# Patient Record
Sex: MALE | Race: WHITE | Employment: FULL TIME | URBAN - NONMETROPOLITAN AREA
[De-identification: names, ages, dates, MRNs, and addresses within clinical notes are randomized per-mention and may not be internally consistent; named-entity substitution may affect disease eponyms.]

---

## 2023-01-04 ENCOUNTER — HOSPITAL ENCOUNTER (EMERGENCY)
Age: 28
Discharge: HOME | End: 2023-01-04
Payer: COMMERCIAL

## 2023-01-04 VITALS
TEMPERATURE: 98.3 F | WEIGHT: 180 LBS | OXYGEN SATURATION: 98 % | SYSTOLIC BLOOD PRESSURE: 139 MMHG | BODY MASS INDEX: 23.86 KG/M2 | RESPIRATION RATE: 16 BRPM | HEART RATE: 91 BPM | HEIGHT: 73 IN | DIASTOLIC BLOOD PRESSURE: 79 MMHG

## 2023-01-04 DIAGNOSIS — H16.133 PHOTOKERATITIS OF BOTH EYES: Primary | ICD-10-CM

## 2023-01-04 PROCEDURE — 99203 OFFICE O/P NEW LOW 30 MIN: CPT

## 2023-01-04 RX ORDER — ERYTHROMYCIN 5 MG/G
OINTMENT OPHTHALMIC
Qty: 3.5 G | Refills: 0 | Status: SHIPPED | OUTPATIENT
Start: 2023-01-04

## 2023-01-04 ASSESSMENT — VISUAL ACUITY
OS: 20/15
OU: 20/15
OD: 20/15

## 2023-01-04 ASSESSMENT — PAIN SCALES - GENERAL: PAINLEVEL_OUTOF10: 3

## 2023-01-04 ASSESSMENT — PAIN DESCRIPTION - DESCRIPTORS: DESCRIPTORS: ACHING

## 2023-01-04 ASSESSMENT — PAIN DESCRIPTION - LOCATION: LOCATION: EYE

## 2023-01-04 ASSESSMENT — PAIN - FUNCTIONAL ASSESSMENT
PAIN_FUNCTIONAL_ASSESSMENT: 0-10
PAIN_FUNCTIONAL_ASSESSMENT: ACTIVITIES ARE NOT PREVENTED

## 2023-01-04 ASSESSMENT — PAIN DESCRIPTION - ORIENTATION: ORIENTATION: RIGHT;LEFT

## 2023-01-04 NOTE — ED TRIAGE NOTES
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 First degree burn to face. Bilateral eye discomfort. Feels like air is blowing into eyes worse with bright lights and cold air. Delay on helmet is not working.

## 2023-01-04 NOTE — Clinical Note
Beto Pierson was seen and treated in our emergency department on 1/4/2023. He may return to work on 01/06/2023. If you have any questions or concerns, please don't hesitate to call.       Jennifer Sanchez, APRN - CNP

## 2023-01-06 ASSESSMENT — ENCOUNTER SYMPTOMS
EYE PAIN: 1
EYE DISCHARGE: 0
PHOTOPHOBIA: 0
EYE REDNESS: 1

## 2023-02-13 ENCOUNTER — HOSPITAL ENCOUNTER (EMERGENCY)
Age: 28
Discharge: HOME | End: 2023-02-13
Attending: EMERGENCY MEDICINE
Payer: COMMERCIAL

## 2023-02-13 VITALS
TEMPERATURE: 98.4 F | SYSTOLIC BLOOD PRESSURE: 135 MMHG | BODY MASS INDEX: 23.75 KG/M2 | DIASTOLIC BLOOD PRESSURE: 84 MMHG | WEIGHT: 180 LBS | RESPIRATION RATE: 20 BRPM | HEART RATE: 129 BPM | OXYGEN SATURATION: 99 %

## 2023-02-13 DIAGNOSIS — T15.01XA FOREIGN BODY OF RIGHT CORNEA, INITIAL ENCOUNTER: Primary | ICD-10-CM

## 2023-02-13 PROCEDURE — 2500000003 HC RX 250 WO HCPCS

## 2023-02-13 PROCEDURE — 99283 EMERGENCY DEPT VISIT LOW MDM: CPT

## 2023-02-13 RX ORDER — PROPARACAINE HYDROCHLORIDE 5 MG/ML
2 SOLUTION/ DROPS OPHTHALMIC ONCE
Status: COMPLETED | OUTPATIENT
Start: 2023-02-13 | End: 2023-02-13

## 2023-02-13 RX ORDER — PROPARACAINE HYDROCHLORIDE 5 MG/ML
SOLUTION/ DROPS OPHTHALMIC
Status: COMPLETED
Start: 2023-02-13 | End: 2023-02-13

## 2023-02-13 RX ADMIN — PROPARACAINE HYDROCHLORIDE 2 DROP: 5 SOLUTION/ DROPS OPHTHALMIC at 21:10

## 2023-02-13 ASSESSMENT — PAIN SCALES - GENERAL: PAINLEVEL_OUTOF10: 3

## 2023-02-13 ASSESSMENT — PAIN - FUNCTIONAL ASSESSMENT: PAIN_FUNCTIONAL_ASSESSMENT: 0-10

## 2023-02-14 NOTE — ED NOTES
Pt comes to ED with c/o metal in eye. Pt states yesterday he was grinding metal and a piece got into his eye. PT states there is a black spec in his right eye. Pt states he tried to use an emergency eye wash at work but it made it worse. Pt denies trouble with vision at this time. Pt states his pain is a 3 out of 10.       Gena Hayes RN  02/13/23 2013

## 2023-02-14 NOTE — ED PROVIDER NOTES
PATIENT NAME: Mic Duff  MRN: [id number]  : 1995  HERNANDEZ: 2023    I performed a history and physical examination of the patient and discussed management with the Resident. I reviewed the Resident's note and agree with the documented findings and plan of care. Any areas of disagreement are noted on the chart. I was personally present for the key portions of any procedures and have documented in the chart those procedures where I was not present during the key portions. I have reviewed the emergency nurses triage note and agree with the chief complaint, past medical history, past surgical history, allergies, medications, social and family history as documented unless otherwise noted below. MEDICAL DEDISION MAKING (MDM)     Mic Duff is a 34 y.o. male who presents to Emergency Department with Eye Problem (right)     Possible right cornea FB which happened 4 days ago when he was welding. Exam: Visual acuity right eye at baseline. Right corneal foreign body, failed FB removal in the ED. Recommend ophthalmology follow-up tomorrow. Discharged in stable conditions. Vitals:    23   BP: 135/84   Pulse: (!) 129   Resp: 20   Temp: 98.4 °F (36.9 °C)   TempSrc: Oral   SpO2: 99%   Weight: 180 lb (81.6 kg)     Medications   proparacaine (ALCAINE) 0.5 % ophthalmic solution 2 drop (2 drops Right Eye Given 23)     Labs Reviewed - No data to display  No orders to display       FINAL IMPRESSION AND DISPOSITION      1.  Foreign body of right cornea, initial encounter        DISPOSITION Decision To Discharge 2023 09:29:35 PM      PATIENT REFERRED TO:  Radames Duong MD  [address]  [phone]    Call in 1 day      Annabella Manning MD  [address]     In 1 week        DISCHARGE MEDICATIONS:  Discharge Medication List as of 2023  9:26 PM          (Please note that portions of this note were completed with a voice recognition program.  Efforts were made to edit the dictations but occasionally words aremis-transcribed.)    MD Luc Rizvi MD  02/14/23 5510

## 2023-02-14 NOTE — ED PROVIDER NOTES
[facility] [address] [address] EMERGENCY DEPT      EMERGENCY MEDICINE     Pt Name: Benito Kramer  MRN: [id number]  [place] 1995  Date of evaluation: 2/13/2023  Provider: Thien Lindo MD  Supervising Physician: Dr. Sloan Ge       Chief Complaint   Patient presents with    Eye Problem     right     HISTORY OF PRESENT ILLNESS   Benito Kramer is a pleasant 34 y.o. male who presents to the emergency department for evaluation of foreign object in eye. 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 Patient used eyewash at work with no relief. PASTMEDICAL HISTORY     Past Medical History:   Diagnosis Date    Anxiety        There is no problem list on file for this patient. SURGICAL HISTORY     History reviewed. No pertinent surgical history. CURRENT MEDICATIONS       Discharge Medication List as of 2/13/2023  9:26 PM          ALLERGIES     has No Known Allergies. FAMILY HISTORY     He indicated that his mother is alive. He indicated that his father is alive. SOCIAL HISTORY       Social History     Tobacco Use    Smoking status: Former   Vaping Use    Vaping Use: Every day    Substances: Nicotine   Substance Use Topics    Alcohol use: No    Drug use: No       PHYSICAL EXAM       ED Triage Vitals [02/13/23 2009]   BP Temp Temp Source Heart Rate Resp SpO2 Height Weight   135/84 98.4 °F (36.9 °C) Oral (!) 129 20 99 % -- 180 lb (81.6 kg)       Physical Exam  Vitals reviewed. Constitutional:       Appearance: Normal appearance. He is normal weight. HENT:      Head: Normocephalic and atraumatic. Right Ear: External ear normal.      Left Ear: External ear normal.      Nose: Nose normal.      Mouth/Throat:      Mouth: Mucous membranes are moist.      Pharynx: Oropharynx is clear. Eyes:      General:         Right eye: Foreign body present.       Extraocular Movements: Extraocular movements intact. Conjunctiva/sclera: Conjunctivae normal.   Cardiovascular:      Rate and Rhythm: Normal rate and regular rhythm. Pulses: Normal pulses. Heart sounds: Normal heart sounds. Pulmonary:      Effort: Pulmonary effort is normal.      Breath sounds: Normal breath sounds. Abdominal:      General: Abdomen is flat. Bowel sounds are normal.      Palpations: Abdomen is soft. Musculoskeletal:         General: Normal range of motion. Cervical back: Normal range of motion. Skin:     General: Skin is warm and dry. Capillary Refill: Capillary refill takes less than 2 seconds. Neurological:      General: No focal deficit present. Mental Status: He is alert. Psychiatric:         Behavior: Behavior normal.         FORMAL DIAGNOSTIC RESULTS     RADIOLOGY: Interpretation per the Radiologist below, if available at the time of this note (none if blank): No orders to display       LABS: (none if blank)  Labs Reviewed - No data to display    (Any cultures that may have been sent were not resulted at the time of this patient visit)    [facility] / ED COURSE:     External Documentation Reviewed:         Previous patient encounter documents & history available on EMR was reviewed: Previous ED notes      1)           Differential Diagnosis includes (but not limited to):  Foreign object in cornea         Diagnoses Considered but I have low suspicion of:   Eye laceration       Decision Rules/Clinical Scores utilized:               See Formal Diagnostic Results above for the lab and radiology tests and orders.     3)  Treatment and Disposition         ED Reassessment:  See ED course         Case discussed with consulting clinician:           Shared Decision-Making was performed and disposition discussed with the        Patient/Family and questions answered          Social determinants of health impacting treatment or disposition:        Summary of Patient Presentation:      60-year-old male with no significant past medical history presents to ED for evaluation of foreign object going to right eye. Patient was welding while wearing equipment when he felt something go into his eye 4 days ago. On physical exam patient has foreign object right next to his cornea. EOM intact. No conjunctiva. Alcaine eyedrops used to remove foreign object, but it seems epithelial layer has grown over foreign object. Patient was recommended to contact ophthalmology tomorrow to make appointment for removal of foreign object. Patient also states he got tetanus shot 2 years ago at [facility].  Was recommended to follow-up with PCP as well. Vitals Reviewed:    Vitals:    02/13/23 2009   BP: 135/84   Pulse: (!) 129   Resp: 20   Temp: 98.4 °F (36.9 °C)   TempSrc: Oral   SpO2: 99%   Weight: 180 lb (81.6 kg)       The patient was seen and examined. Appropriate diagnostic testing was performed and results reviewed with the patient. The results of pertinent diagnostic studies and exam findings were discussed. The patients provisional diagnosis and plan of care were discussed with the patient and present family who expressed understanding. Any medications were reviewed and indications and risks of medications were discussed with the patient /family present. Strict verbal and written return precautions, instructions and appropriate follow-up provided to  the patient . ED Medications administered this visit:  (None if blank)  Medications   proparacaine (ALCAINE) 0.5 % ophthalmic solution 2 drop (2 drops Right Eye Given 2/13/23 2110)         PROCEDURES: (None if blank)  Procedures:     CRITICAL CARE: (None if blank)      DISCHARGE PRESCRIPTIONS: (None if blank)  Discharge Medication List as of 2/13/2023  9:26 PM          FINAL IMPRESSION      1.  Foreign body of right cornea, initial encounter          DISPOSITION/PLAN   DISPOSITION Decision To Discharge 02/13/2023 09:29:35 PM      OUTPATIENT FOLLOW UP THE PATIENT:  Adrienne Cassidy MD  [address]  [address]    Call in 1 day      Darin Tejeda MD  [facility] [address]    In 1 week      MD Darin Milner MD  Resident  02/13/23 Shelton Randle MD  Resident  02/13/23 7405

## 2023-05-29 ENCOUNTER — APPOINTMENT (OUTPATIENT)
Dept: GENERAL RADIOLOGY | Age: 28
End: 2023-05-29
Payer: COMMERCIAL

## 2023-05-29 ENCOUNTER — HOSPITAL ENCOUNTER (EMERGENCY)
Age: 28
Discharge: HOME | End: 2023-05-29
Attending: EMERGENCY MEDICINE
Payer: COMMERCIAL

## 2023-05-29 VITALS
TEMPERATURE: 98.8 F | SYSTOLIC BLOOD PRESSURE: 138 MMHG | RESPIRATION RATE: 16 BRPM | DIASTOLIC BLOOD PRESSURE: 80 MMHG | HEART RATE: 112 BPM | OXYGEN SATURATION: 98 %

## 2023-05-29 DIAGNOSIS — S39.012A BACK STRAIN, INITIAL ENCOUNTER: ICD-10-CM

## 2023-05-29 DIAGNOSIS — G57.01 PIRIFORMIS SYNDROME OF RIGHT SIDE: Primary | ICD-10-CM

## 2023-05-29 PROCEDURE — 72100 X-RAY EXAM L-S SPINE 2/3 VWS: CPT

## 2023-05-29 PROCEDURE — 99284 EMERGENCY DEPT VISIT MOD MDM: CPT | Performed by: EMERGENCY MEDICINE

## 2023-05-29 PROCEDURE — 6360000002 HC RX W HCPCS

## 2023-05-29 PROCEDURE — 96372 THER/PROPH/DIAG INJ SC/IM: CPT

## 2023-05-29 RX ORDER — ORPHENADRINE CITRATE 30 MG/ML
60 INJECTION INTRAMUSCULAR; INTRAVENOUS ONCE
Status: COMPLETED | OUTPATIENT
Start: 2023-05-29 | End: 2023-05-29

## 2023-05-29 RX ORDER — CYCLOBENZAPRINE HCL 10 MG
10 TABLET ORAL 3 TIMES DAILY PRN
Qty: 21 TABLET | Refills: 0 | Status: SHIPPED | OUTPATIENT
Start: 2023-05-29 | End: 2023-05-29 | Stop reason: SDUPTHER

## 2023-05-29 RX ORDER — KETOROLAC TROMETHAMINE 30 MG/ML
30 INJECTION, SOLUTION INTRAMUSCULAR; INTRAVENOUS ONCE
Status: COMPLETED | OUTPATIENT
Start: 2023-05-29 | End: 2023-05-29

## 2023-05-29 RX ORDER — CYCLOBENZAPRINE HCL 10 MG
10 TABLET ORAL DAILY PRN
Qty: 21 TABLET | Refills: 0 | Status: SHIPPED | OUTPATIENT
Start: 2023-05-29 | End: 2023-06-12

## 2023-05-29 RX ORDER — IBUPROFEN 600 MG/1
600 TABLET ORAL EVERY 8 HOURS PRN
Qty: 15 TABLET | Refills: 0 | Status: SHIPPED | OUTPATIENT
Start: 2023-05-29

## 2023-05-29 RX ADMIN — ORPHENADRINE CITRATE 60 MG: 60 INJECTION INTRAMUSCULAR; INTRAVENOUS at 18:11

## 2023-05-29 RX ADMIN — KETOROLAC TROMETHAMINE 30 MG: 30 INJECTION, SOLUTION INTRAMUSCULAR at 17:41

## 2023-05-29 ASSESSMENT — ENCOUNTER SYMPTOMS
BACK PAIN: 1
GASTROINTESTINAL NEGATIVE: 1
EYES NEGATIVE: 1
RESPIRATORY NEGATIVE: 1

## 2023-05-29 NOTE — ED PROVIDER NOTES
Patient is a , who operates heavy machinery. Patient also prescribed Motrin discharge. Pt was  given instructions on exercise of piriformis. Amount and/or Complexity of Data Reviewed  Tests in the radiology section of CPT®: ordered and reviewed  Discussion of test results with the performing providers: no  Decide to obtain previous medical records or to obtain history from someone other than the patient: no  Obtain history from someone other than the patient: no  Review and summarize past medical records: yes  Discuss the patient with other providers: yes  Independent visualization of images, tracings, or specimens: yes    Risk of Complications, Morbidity, and/or Mortality  Presenting problems: minimal  Diagnostic procedures: minimal  Management options: minimal    Patient Progress  Patient progress: improved     Final diagnoses:   Piriformis syndrome of right side   Back strain, initial encounter     Condition: condition: good  Dispo: Discharge to home      This transcription was electronically signed. Parts of this transcriptions may have been dictated by use of voice recognition software and electronically transcribed, and parts may have been transcribed with the assistance of an ED scribe. The transcription may contain errors not detected in proofreading. Please refer to my supervising physician's documentation if my documentation differs.     Electronically Signed: Sravan Javier DO, 05/29/23, 7:03 PM        Sravan Javier DO  Resident  05/29/23 Cynthia Nguyễn,   Resident  05/29/23 [id number]

## 2023-05-29 NOTE — ED TRIAGE NOTES
Pt to er. Pt c/o rt sided back pain that shoots down his rt leg. States has hx of sciatic nerve pain. No new injury.

## 2023-12-01 ENCOUNTER — HOSPITAL ENCOUNTER (EMERGENCY)
Age: 28
Discharge: HOME | End: 2023-12-01
Payer: COMMERCIAL

## 2023-12-01 VITALS
SYSTOLIC BLOOD PRESSURE: 147 MMHG | WEIGHT: 181.6 LBS | DIASTOLIC BLOOD PRESSURE: 102 MMHG | HEART RATE: 117 BPM | RESPIRATION RATE: 16 BRPM | TEMPERATURE: 98.4 F | BODY MASS INDEX: 23.96 KG/M2 | OXYGEN SATURATION: 100 %

## 2023-12-01 DIAGNOSIS — L50.9 URTICARIA: Primary | ICD-10-CM

## 2023-12-01 LAB
FLUAV AG SPEC QL: NEGATIVE
FLUBV AG SPEC QL: NEGATIVE

## 2023-12-01 PROCEDURE — 87804 INFLUENZA ASSAY W/OPTIC: CPT

## 2023-12-01 PROCEDURE — 6370000000 HC RX 637 (ALT 250 FOR IP): Performed by: NURSE PRACTITIONER

## 2023-12-01 PROCEDURE — 96372 THER/PROPH/DIAG INJ SC/IM: CPT

## 2023-12-01 PROCEDURE — 6360000002 HC RX W HCPCS: Performed by: NURSE PRACTITIONER

## 2023-12-01 PROCEDURE — 2580000003 HC RX 258: Performed by: NURSE PRACTITIONER

## 2023-12-01 PROCEDURE — 99213 OFFICE O/P EST LOW 20 MIN: CPT

## 2023-12-01 PROCEDURE — 99213 OFFICE O/P EST LOW 20 MIN: CPT | Performed by: NURSE PRACTITIONER

## 2023-12-01 RX ORDER — FAMOTIDINE 20 MG/1
40 TABLET, FILM COATED ORAL ONCE
Status: COMPLETED | OUTPATIENT
Start: 2023-12-01 | End: 2023-12-01

## 2023-12-01 RX ORDER — DIPHENHYDRAMINE HYDROCHLORIDE 50 MG/ML
25 INJECTION INTRAMUSCULAR; INTRAVENOUS ONCE
Status: COMPLETED | OUTPATIENT
Start: 2023-12-01 | End: 2023-12-01

## 2023-12-01 RX ORDER — PREDNISONE 10 MG/1
TABLET ORAL
Qty: 21 TABLET | Refills: 0 | Status: SHIPPED | OUTPATIENT
Start: 2023-12-01 | End: 2023-12-07

## 2023-12-01 RX ORDER — CETIRIZINE HYDROCHLORIDE 10 MG/1
10 TABLET ORAL DAILY
Qty: 30 TABLET | Refills: 0 | Status: SHIPPED | OUTPATIENT
Start: 2023-12-01 | End: 2023-12-31

## 2023-12-01 RX ORDER — FAMOTIDINE 20 MG/1
20 TABLET, FILM COATED ORAL ONCE
Status: DISCONTINUED | OUTPATIENT
Start: 2023-12-01 | End: 2023-12-01

## 2023-12-01 RX ADMIN — FAMOTIDINE 40 MG: 20 TABLET, FILM COATED ORAL at 17:25

## 2023-12-01 RX ADMIN — FAMOTIDINE 20 MG: 20 TABLET, FILM COATED ORAL at 17:30

## 2023-12-01 RX ADMIN — WATER 125 MG: 1 INJECTION INTRAMUSCULAR; INTRAVENOUS; SUBCUTANEOUS at 17:28

## 2023-12-01 RX ADMIN — DIPHENHYDRAMINE HYDROCHLORIDE 25 MG: 50 INJECTION INTRAMUSCULAR; INTRAVENOUS at 17:29

## 2023-12-01 ASSESSMENT — PAIN - FUNCTIONAL ASSESSMENT: PAIN_FUNCTIONAL_ASSESSMENT: 0-10

## 2023-12-01 ASSESSMENT — PAIN SCALES - GENERAL: PAINLEVEL_OUTOF10: 3

## 2023-12-01 ASSESSMENT — ENCOUNTER SYMPTOMS
VOMITING: 0
DIARRHEA: 1
NAUSEA: 1

## 2023-12-01 ASSESSMENT — PAIN DESCRIPTION - LOCATION: LOCATION: HEAD

## 2023-12-01 NOTE — ED TRIAGE NOTES
Cony Whyte arrives to room with complaint of  itchy red hives on forehead and chest started today approx 2:00 pm.  Took jaswant selser daytime 2 tabs approx 9:00 am today to help with fatigue and chills,   Pt took benadryl 2 tablets approx 2:00 but is not helping        Pt still with chills, fatigue, headache, diarrhea, nausea, no appetite.

## 2024-02-14 ENCOUNTER — APPOINTMENT (OUTPATIENT)
Dept: GENERAL RADIOLOGY | Age: 29
End: 2024-02-14
Payer: COMMERCIAL

## 2024-02-14 ENCOUNTER — HOSPITAL ENCOUNTER (EMERGENCY)
Age: 29
Discharge: HOME | End: 2024-02-14
Attending: EMERGENCY MEDICINE
Payer: COMMERCIAL

## 2024-02-14 VITALS
TEMPERATURE: 98.3 F | DIASTOLIC BLOOD PRESSURE: 97 MMHG | RESPIRATION RATE: 18 BRPM | HEART RATE: 104 BPM | SYSTOLIC BLOOD PRESSURE: 157 MMHG | OXYGEN SATURATION: 100 %

## 2024-02-14 DIAGNOSIS — S62.619A AVULSION FRACTURE OF PROXIMAL PHALANX OF FINGER, CLOSED, INITIAL ENCOUNTER: Primary | ICD-10-CM

## 2024-02-14 PROCEDURE — 73130 X-RAY EXAM OF HAND: CPT

## 2024-02-14 PROCEDURE — 99283 EMERGENCY DEPT VISIT LOW MDM: CPT

## 2024-02-14 RX ORDER — LIDOCAINE HYDROCHLORIDE AND EPINEPHRINE 10; 10 MG/ML; UG/ML
20 INJECTION, SOLUTION INFILTRATION; PERINEURAL ONCE
Status: DISCONTINUED | OUTPATIENT
Start: 2024-02-14 | End: 2024-02-15 | Stop reason: HOSPADM

## 2024-02-15 NOTE — ED PROVIDER NOTES
10:57 PM        Labs Reviewed - No data to display      Final diagnoses:   Avulsion fracture of proximal phalanx of finger, closed, initial encounter   .  I have seen this patient with the resident , and agree with her assessment and plan.     Reji Agarwal, DO  02/15/24 0628    
perfusion: unchanged      Procedure completion:  Tolerated    Post-procedure imaging: reviewed    Comments:      Digital nerve block with lidocaine 1%  :     Consultants:  None    Decision Rules/Clinical Scores utilized:  Not Applicable     CRITICAL CARE:  None    MEDICATION CHANGES     Discharge Medication List as of 2/14/2024 11:32 PM          FINAL IMPRESSION     Final diagnoses:   Avulsion fracture of proximal phalanx of finger, closed, initial encounter       DISPOSITION   Condition: condition: good  Dispo: Discharge to home  DISPOSITION Decision To Discharge 02/14/2024 11:32:16 PM        Outpatient Follow-Up:  Gregg Bustamante MD  [address] [id number]  [phone]    Go in 1 day        DISCHARGE MEDICATIONS:  Discharge Medication List as of 2/14/2024 11:32 PM              {Blank multiple:[id number]::\"The results of pertinent diagnostic studies and exam findings were discussed with the patient/surrogate.     This transcription was electronically signed. Parts of this transcriptions may have been dictated by use of voice recognition software and electronically transcribed. The transcription may contain errors not detected in proofreading. Please refer to my supervising physician's documentation if my documentation differs.    Electronically Signed: Andrew Lacey DO, 02/15/24, 1:38 AM

## 2024-02-15 NOTE — ED TRIAGE NOTES
Pt presents to the ED from work with complaints of jamming his pinky finger and now it is stuck. Pt rates pain an 8/10.

## 2024-02-16 ENCOUNTER — HOSPITAL ENCOUNTER (EMERGENCY)
Age: 29
Discharge: HOME | End: 2024-02-16
Attending: FAMILY MEDICINE
Payer: COMMERCIAL

## 2024-02-16 VITALS
TEMPERATURE: 98.4 F | BODY MASS INDEX: 23.7 KG/M2 | SYSTOLIC BLOOD PRESSURE: 148 MMHG | RESPIRATION RATE: 16 BRPM | DIASTOLIC BLOOD PRESSURE: 87 MMHG | HEIGHT: 72 IN | WEIGHT: 175 LBS | HEART RATE: 83 BPM | OXYGEN SATURATION: 100 %

## 2024-02-16 DIAGNOSIS — B37.81 THRUSH OF MOUTH AND ESOPHAGUS (HCC): Primary | ICD-10-CM

## 2024-02-16 DIAGNOSIS — B37.0 THRUSH OF MOUTH AND ESOPHAGUS (HCC): Primary | ICD-10-CM

## 2024-02-16 PROCEDURE — 99283 EMERGENCY DEPT VISIT LOW MDM: CPT

## 2024-02-16 RX ORDER — FLUCONAZOLE 100 MG/1
100 TABLET ORAL DAILY
Qty: 7 TABLET | Refills: 0 | Status: SHIPPED | OUTPATIENT
Start: 2024-02-16 | End: 2024-02-23

## 2024-02-16 ASSESSMENT — PAIN - FUNCTIONAL ASSESSMENT: PAIN_FUNCTIONAL_ASSESSMENT: 0-10

## 2024-02-16 ASSESSMENT — PAIN DESCRIPTION - LOCATION: LOCATION: MOUTH

## 2024-02-16 ASSESSMENT — PAIN SCALES - GENERAL: PAINLEVEL_OUTOF10: 3

## 2024-02-16 NOTE — ED PROVIDER NOTES
sclera nonicteric   HENT: Atraumatic, no trismus, mild erythema to inner oral cavity of right cheek; no palpable parotid swelling noted  Neck: supple, no JVD, no swelling in the left anterior anterior cervical region  Respiratory: Normal respiratory effort  Integument: Well hydrated, no petechiae   Neurologic: Alert & oriented, no slurred speech, grossly normal motor function         RADIOLOGY   No orders to display        Labs Reviewed - No data to display     INITIAL IMPRESSION   Possible thrush vs parotitis but otherwise cannot rule out dental infection but unlikely to be abscess.    Pertinent Labs & Imaging studies reviewed and interpreted. (See chart for details)     This is an acute stable problem.    FINAL IMPRESSION   1. Thrush of mouth and esophagus (HCC)         ED COURSE & MEDICAL DECISION MAKING   Pleasant 29M presents with whitish and reddish inner cheek suggestive of either canker sore or thrush, less likely to be due to parotid gland obstruction. In any event, pt will be given a Rx for 100 mg diflucan qday for 7 days.    FINAL DISPO:  Discharge home.     Electronically signed by: CHIP EUGENE MD, 2/16/2024 3:55 PM         Chip Eugene MD  02/16/24 1382

## 2024-02-16 NOTE — ED NOTES
Patient has c/o sores and white patches in mouth along either cheek that appeared a week ago. Pt states sores went away but last evening noticed sore appearing again. Pt attempted antiseptic mouth wash and tylenol for pain with some relief.

## 2024-02-16 NOTE — DISCHARGE INSTRUCTIONS
PLEASE TAKE DIFLUCAN DAILY FOR YOUR THRUSH (YEAST IN MOUTH).    ALSO TAKE \"SOUR DROPS\" TO HELP AS WELL.

## 2024-03-11 ENCOUNTER — HOSPITAL ENCOUNTER (EMERGENCY)
Age: 29
Discharge: HOME | End: 2024-03-11
Payer: COMMERCIAL

## 2024-03-11 VITALS
WEIGHT: 175 LBS | BODY MASS INDEX: 23.7 KG/M2 | HEIGHT: 72 IN | OXYGEN SATURATION: 99 % | DIASTOLIC BLOOD PRESSURE: 78 MMHG | SYSTOLIC BLOOD PRESSURE: 118 MMHG | RESPIRATION RATE: 16 BRPM | HEART RATE: 86 BPM | TEMPERATURE: 98.9 F

## 2024-03-11 DIAGNOSIS — B37.0 THRUSH: Primary | ICD-10-CM

## 2024-03-11 PROCEDURE — 99213 OFFICE O/P EST LOW 20 MIN: CPT | Performed by: NURSE PRACTITIONER

## 2024-03-11 PROCEDURE — 99213 OFFICE O/P EST LOW 20 MIN: CPT

## 2024-03-11 ASSESSMENT — ENCOUNTER SYMPTOMS
SORE THROAT: 0
NAUSEA: 0
VOMITING: 0
SHORTNESS OF BREATH: 0
COUGH: 0

## 2024-03-11 ASSESSMENT — PAIN - FUNCTIONAL ASSESSMENT: PAIN_FUNCTIONAL_ASSESSMENT: 0-10

## 2024-03-11 ASSESSMENT — PAIN DESCRIPTION - LOCATION: LOCATION: MOUTH

## 2024-03-11 ASSESSMENT — PAIN SCALES - GENERAL: PAINLEVEL_OUTOF10: 3

## 2024-03-11 ASSESSMENT — PAIN DESCRIPTION - FREQUENCY: FREQUENCY: CONTINUOUS

## 2024-03-11 ASSESSMENT — PAIN DESCRIPTION - ONSET: ONSET: ON-GOING

## 2024-03-11 ASSESSMENT — PAIN DESCRIPTION - DESCRIPTORS: DESCRIPTORS: BURNING

## 2024-03-11 ASSESSMENT — PAIN DESCRIPTION - PAIN TYPE: TYPE: ACUTE PAIN

## 2024-03-11 NOTE — ED TRIAGE NOTES
Arrives to [facility] for the evaluation of oral pain, possible thrush.  Was seen at [facility] ER on 2/16/24 for thrush of mouth and esophagus and treated with Fluconazole 100 mg daily for 7 days.  Did have improvement and then came back.  Rinsed mouth with Apple cider vinegar as directed by a Google search.  Took the white of tongue.  Mostly has burning pain and redness to inner cheeks of mouth.  Rates pain 3/10 in severity.  After brushing teeth pain is 6/10 in severity.  VSS.  Waiting provider to assess for orders.

## 2024-03-11 NOTE — ED PROVIDER NOTES
[facility]  Urgent Care Encounter       CHIEF COMPLAINT       Chief Complaint   Patient presents with    Thrush     Oral \"Burning\" pain  Tx for Thrush on 2/16/24        Nurses Notes reviewed and I agree except as noted in the HPI.  HISTORY OF PRESENT ILLNESS   West Santos is a 29 y.o. male who presents to urgent care for evaluation of thrush.  Patient was seen in the emergency department around 1 month ago for thrush and was treated with oral antifungals.  He reports his symptoms got better but once he was done completing his medication, the symptoms returned.  He reports he has had some \"white patches\" on his cheeks from prior that he scraped off with his toothbrush and now has open sores that burn continuously.  He read online apple cider vinegar may be a remedy and attempted to swish and spit, but it would only worsen symptoms.  He does report that he had unprotected sex with a woman about 1 week prior to his emergency department visit and has not spoken to her since.  He is unsure whether she had any STIs or not.  He is here for further evaluation.    The history is provided by the patient.       REVIEW OF SYSTEMS     Review of Systems   Constitutional:  Negative for chills and fever.   HENT:  Negative for congestion, mouth sores and sore throat.    Respiratory:  Negative for cough and shortness of breath.    Cardiovascular:  Negative for chest pain.   Gastrointestinal:  Negative for nausea and vomiting.   Genitourinary:  Negative for difficulty urinating, dysuria, flank pain, frequency and genital sores.   Musculoskeletal:  Negative for arthralgias and myalgias.   Skin:  Positive for rash.   Allergic/Immunologic: Negative for environmental allergies.   Neurological:  Negative for headaches.       PAST MEDICAL HISTORY         Diagnosis Date    Anxiety     Thrush of mouth and esophagus (HCC) 02/16/2024       SURGICALHISTORY     Patient  has no past surgical history on file.    CURRENT